# Patient Record
Sex: FEMALE | Race: WHITE | NOT HISPANIC OR LATINO | Employment: OTHER | URBAN - NONMETROPOLITAN AREA
[De-identification: names, ages, dates, MRNs, and addresses within clinical notes are randomized per-mention and may not be internally consistent; named-entity substitution may affect disease eponyms.]

---

## 2024-04-18 ENCOUNTER — OFFICE VISIT (OUTPATIENT)
Dept: CARDIOLOGY | Facility: CLINIC | Age: 69
End: 2024-04-18
Payer: MEDICARE

## 2024-04-18 VITALS
HEART RATE: 68 BPM | DIASTOLIC BLOOD PRESSURE: 88 MMHG | HEIGHT: 62 IN | SYSTOLIC BLOOD PRESSURE: 142 MMHG | BODY MASS INDEX: 27.97 KG/M2 | WEIGHT: 152 LBS

## 2024-04-18 DIAGNOSIS — Z01.810 PRE-OPERATIVE CARDIOVASCULAR EXAMINATION: ICD-10-CM

## 2024-04-18 DIAGNOSIS — I10 ESSENTIAL HYPERTENSION: ICD-10-CM

## 2024-04-18 PROBLEM — R94.31 ABNORMAL EKG: Status: ACTIVE | Noted: 2024-04-18

## 2024-04-18 PROCEDURE — 1159F MED LIST DOCD IN RCRD: CPT | Performed by: INTERNAL MEDICINE

## 2024-04-18 PROCEDURE — 1160F RVW MEDS BY RX/DR IN RCRD: CPT | Performed by: INTERNAL MEDICINE

## 2024-04-18 PROCEDURE — 3079F DIAST BP 80-89 MM HG: CPT | Performed by: INTERNAL MEDICINE

## 2024-04-18 PROCEDURE — 93000 ELECTROCARDIOGRAM COMPLETE: CPT | Performed by: INTERNAL MEDICINE

## 2024-04-18 PROCEDURE — 99203 OFFICE O/P NEW LOW 30 MIN: CPT | Performed by: INTERNAL MEDICINE

## 2024-04-18 PROCEDURE — 3075F SYST BP GE 130 - 139MM HG: CPT | Performed by: INTERNAL MEDICINE

## 2024-04-18 PROCEDURE — 1036F TOBACCO NON-USER: CPT | Performed by: INTERNAL MEDICINE

## 2024-04-18 RX ORDER — OLMESARTAN MEDOXOMIL AND HYDROCHLOROTHIAZIDE 20/12.5 20; 12.5 MG/1; MG/1
1 TABLET ORAL DAILY
COMMUNITY
Start: 2024-03-12

## 2024-04-18 RX ORDER — FAMOTIDINE 20 MG/1
20 TABLET, FILM COATED ORAL AS NEEDED
COMMUNITY

## 2024-04-18 NOTE — PROGRESS NOTES
Referred by Dr. Poon ref. provider found for Pre-op Clearance ([place]-trigger finger-4/29)     History Of Present Illness:    Luci Garcia is a 68 y.o. female presenting with history of hypertension, lipid profile not available, no cardiac symptoms, does have more than 4 METS of activity on a regular basis, preserved LV systolic function by echocardiogram from 2022, negative treadmill stress test at a workload of 10.1 METS January 2022, presents for cardiac risk assessment prior to trigger finger surgery.  She is postmenopausal, has history of hypertension and BMI is 28.  She is very active.  She has several grandchildren and she is an active participant in taking care of them.  They are all under the age of 2.    Interval review of systems is negative for chest discomfort pressure tightness heaviness palpitations lightheadedness orthopnea paroxysmal nocturnal dyspnea dependent edema or claudication TIA or CVA type symptoms or bleeding diathesis    Past Medical History:  She has no past medical history on file.    Past Surgical History:  She has a past surgical history that includes Tonsillectomy; Total vaginal hysterectomy (2005); and Knee arthroscopy w/ arthrotomy.      Social History:  She reports that she has never smoked. She has never used smokeless tobacco. She reports that she does not drink alcohol and does not use drugs.    Family History:  Family History   Problem Relation Name Age of Onset    Atrial fibrillation Mother      Heart failure Mother      Diabetes Father          Allergies:  Patient has no known allergies.    Outpatient Medications:  Current Outpatient Medications   Medication Instructions    famotidine (PEPCID) 20 mg, oral, As needed    olmesartan-hydrochlorothiazide (BENIcar HCT) 20-12.5 mg tablet 1 tablet, oral, Daily        Last Recorded Vitals:  Vitals:    04/18/24 0941 04/18/24 0942   BP: (!) 154/92 138/86   BP Location: Left arm Right arm   Patient Position: Sitting Sitting   Pulse: 68   "  Weight: 68.9 kg (152 lb)    Height: 1.575 m (5' 2\")        Physical Exam:    GENERAL APPEARANCE: Well developed, well nourished, in no acute distress.  CHEST: Symmetric and non-tender.  INTEGUMENT: Skin warm and dry, without gross excoriationis or lesions.  HEENT: No gross abnormalities, no jugular venous distention no carotid bruit or scleral icterus  NECK: Supple, no JVD, no bruit. Thyroid not palpable. Carotid upstrokes normal.  NEURO/PSHCY: Alert and oriented x3; appropriate behavior and responses and responses, with normal balance and coordination  LUNGS: Clear to auscultation bilaterally; normal respiratory effort.  HEART: Rate and rhythm regular with no evident murmur; no gallop appreciated. There are no rubs, clicks or heaves.   ABDOMEN: Soft, nontender, no masses  or bruits.   MUSCULOSKELETAL: No obvious deformity identified  EXTREMITIES: Warm  There is no edema noted.  PERIPHERAL VASCULAR: Pulses present and equally palpable; 2+ throughout.          Labs reviewed today : No laboratory data available for review.    Assessment/Plan   Diagnoses and all orders for this visit:  Pre-operative cardiovascular examination  -     ECG 12 Lead  Essential hypertension      1. Pre-operative cardiovascular examination        2. Essential hypertension        Clinical discussion:  EKG shows abnormal R wave progression.  However patient is very active, without cardiac symptoms, with normal echocardiogram and a normal treadmill stress test in 2022.  Unfortunately I do not have a previous EKG for comparison.  Surgery is quite minor, and is going to be done under regional anesthesia/twilight anesthesia.  She can proceed with surgery without further cardiac testing.  We talked about the dynamic nature of coronary artery disease and the importance of seeking prompt medical attention if new symptoms develop  Low blood pressure was originally reported to be asymmetric in her upper extremities, this was repeated and blood " pressure is symmetric in both upper extremities.  Her radial pulses are strong and symmetric bilaterally.    Her blood pressure is above target today, she says she forgot to take her antihypertensive yesterday.  She can see me on an as-needed basis  Thank you for allowing me to participate in Luci's care, please do not hesitate to call if further questions arise,  Sincerely,  Yanira Ni MD [facility]        Provider Attestation - Scribe documentation    All medical record entries made by the Scribe were at my direction and personally dictated by me. I have reviewed the chart and agree that the record accurately reflects my personal performance of the history, physical exam, discussion and plan.   Scribe Attestation  By signing my name below, I, Yessenia Rouse LPN   attest that this documentation has been prepared under the direction and in the presence of Yanira Ni MD.

## 2024-04-18 NOTE — LETTER
April 18, 2024     Tony Flores DO  [address] [place] [id number]    Patient: Luci Garcia   YOB: 1955   Date of Visit: 4/18/2024       Dear Dr. Tony Flores DO:    Thank you for referring Luci Garcia to me for evaluation. Below are my notes for this consultation.  If you have questions, please do not hesitate to call me. I look forward to following your patient along with you.       Sincerely,     Yanira Ni MD      CC: No Recipients  ______________________________________________________________________________________    Referred by  No ref. provider found for Pre-op Clearance ([place]-trigger finger-4/29)     History Of Present Illness:    Luci Garcia is a 68 y.o. female presenting with history of hypertension, lipid profile not available, no cardiac symptoms, does have more than 4 METS of activity on a regular basis, preserved LV systolic function by echocardiogram from 2022, negative treadmill stress test at a workload of 10.1 METS January 2022, presents for cardiac risk assessment prior to trigger finger surgery.  She is postmenopausal, has history of hypertension and BMI is 28.  She is very active.  She has several grandchildren and she is an active participant in taking care of them.  They are all under the age of 2.    Interval review of systems is negative for chest discomfort pressure tightness heaviness palpitations lightheadedness orthopnea paroxysmal nocturnal dyspnea dependent edema or claudication TIA or CVA type symptoms or bleeding diathesis    Past Medical History:  She has no past medical history on file.    Past Surgical History:  She has a past surgical history that includes Tonsillectomy; Total vaginal hysterectomy (2005); and Knee arthroscopy w/ arthrotomy.      Social History:  She reports that she has never smoked. She has never used smokeless tobacco. She reports that she does not drink alcohol and does not use drugs.    Family  "History:  Family History   Problem Relation Name Age of Onset   • Atrial fibrillation Mother     • Heart failure Mother     • Diabetes Father          Allergies:  Patient has no known allergies.    Outpatient Medications:  Current Outpatient Medications   Medication Instructions   • famotidine (PEPCID) 20 mg, oral, As needed   • olmesartan-hydrochlorothiazide (BENIcar HCT) 20-12.5 mg tablet 1 tablet, oral, Daily        Last Recorded Vitals:  Vitals:    04/18/24 0941 04/18/24 0942   BP: (!) 154/92 138/86   BP Location: Left arm Right arm   Patient Position: Sitting Sitting   Pulse: 68    Weight: 68.9 kg (152 lb)    Height: 1.575 m (5' 2\")        Physical Exam:    GENERAL APPEARANCE: Well developed, well nourished, in no acute distress.  CHEST: Symmetric and non-tender.  INTEGUMENT: Skin warm and dry, without gross excoriationis or lesions.  HEENT: No gross abnormalities, no jugular venous distention no carotid bruit or scleral icterus  NECK: Supple, no JVD, no bruit. Thyroid not palpable. Carotid upstrokes normal.  NEURO/PSHCY: Alert and oriented x3; appropriate behavior and responses and responses, with normal balance and coordination  LUNGS: Clear to auscultation bilaterally; normal respiratory effort.  HEART: Rate and rhythm regular with no evident murmur; no gallop appreciated. There are no rubs, clicks or heaves.   ABDOMEN: Soft, nontender, no masses  or bruits.   MUSCULOSKELETAL: No obvious deformity identified  EXTREMITIES: Warm  There is no edema noted.  PERIPHERAL VASCULAR: Pulses present and equally palpable; 2+ throughout.          Labs reviewed today : No laboratory data available for review.    Assessment/Plan  Diagnoses and all orders for this visit:  Pre-operative cardiovascular examination  -     ECG 12 Lead  Essential hypertension      1. Pre-operative cardiovascular examination        2. Essential hypertension        Clinical discussion:  EKG shows abnormal R wave progression.  However patient is " very active, without cardiac symptoms, with normal echocardiogram and a normal treadmill stress test in 2022.  Unfortunately I do not have a previous EKG for comparison.  Surgery is quite minor, and is going to be done under regional anesthesia/twilight anesthesia.  She can proceed with surgery without further cardiac testing.  We talked about the dynamic nature of coronary artery disease and the importance of seeking prompt medical attention if new symptoms develop  Low blood pressure was originally reported to be asymmetric in her upper extremities, this was repeated and blood pressure is symmetric in both upper extremities.  Her radial pulses are strong and symmetric bilaterally.    Her blood pressure is above target today, she says she forgot to take her antihypertensive yesterday.  She can see me on an as-needed basis  Thank you for allowing me to participate in Luci's care, please do not hesitate to call if further questions arise,  Sincerely,  Yanira Ni MD [facility]        Provider Attestation - Scribe documentation    All medical record entries made by the Scribe were at my direction and personally dictated by me. I have reviewed the chart and agree that the record accurately reflects my personal performance of the history, physical exam, discussion and plan.   Scribe Attestation  By signing my name below, I, Yessenia Rouse LPN   attest that this documentation has been prepared under the direction and in the presence of Yanira Ni MD.

## 2024-04-18 NOTE — PATIENT INSTRUCTIONS
Please bring all medicines, vitamins, and herbal supplements with you when you come to the office.    Prescriptions will not be filled unless you are compliant with your follow up appointments or have a follow up appointment scheduled as per instruction of your physician. Refills should be requested at the time of your visit.     EKG done in office today   Patient is cleared from a cardiac stand point.  Follow as needed.